# Patient Record
Sex: FEMALE | Race: BLACK OR AFRICAN AMERICAN | NOT HISPANIC OR LATINO | URBAN - METROPOLITAN AREA
[De-identification: names, ages, dates, MRNs, and addresses within clinical notes are randomized per-mention and may not be internally consistent; named-entity substitution may affect disease eponyms.]

---

## 2019-04-19 ENCOUNTER — OFFICE (OUTPATIENT)
Dept: URBAN - METROPOLITAN AREA CLINIC 11 | Facility: CLINIC | Age: 54
End: 2019-04-19

## 2019-04-19 VITALS — WEIGHT: 199 LBS | SYSTOLIC BLOOD PRESSURE: 158 MMHG | HEIGHT: 64 IN | DIASTOLIC BLOOD PRESSURE: 96 MMHG

## 2019-04-19 DIAGNOSIS — R13.10 DYSPHAGIA, UNSPECIFIED: ICD-10-CM

## 2019-04-19 DIAGNOSIS — K59.00 CONSTIPATION, UNSPECIFIED: ICD-10-CM

## 2019-04-19 DIAGNOSIS — R10.12 LEFT UPPER QUADRANT PAIN: ICD-10-CM

## 2019-04-19 DIAGNOSIS — E11.9 TYPE 2 DIABETES MELLITUS WITHOUT COMPLICATIONS: ICD-10-CM

## 2019-04-19 PROCEDURE — 99213 OFFICE O/P EST LOW 20 MIN: CPT | Performed by: INTERNAL MEDICINE

## 2019-04-19 NOTE — SERVICEHPINOTES
54-year-old female with 2 month history of left upper quadrant bloating, occasional nausea and 2 episodes of emesis over the past 2 months.  Patient also reports dysphagia to solids that occurs on a daily basis and requires water to help food pass into her stomach.  Patient underwent upper endoscopy with esophageal biopsies in 2016 which were normal.  It should be noted the patient is diabetic and has been on medications for about 10 years.Patient also reports constipation with stools that are hard and difficult to pass.  She has been placed on stool softeners in the past but is only taking these on a p.r.n. basis.  There has been no sign of fever, chills, weight loss or bleeding.

## 2019-04-29 ENCOUNTER — OFFICE (OUTPATIENT)
Dept: URBAN - METROPOLITAN AREA PATHOLOGY 22 | Facility: PATHOLOGY | Age: 54
End: 2019-04-29

## 2019-04-29 ENCOUNTER — AMBULATORY SURGICAL CENTER (OUTPATIENT)
Dept: URBAN - METROPOLITAN AREA SURGERY 3 | Facility: SURGERY | Age: 54
End: 2019-04-29

## 2019-04-29 ENCOUNTER — OFFICE (OUTPATIENT)
Dept: URBAN - METROPOLITAN AREA CLINIC 11 | Facility: CLINIC | Age: 54
End: 2019-04-29

## 2019-04-29 VITALS
HEART RATE: 72 BPM | RESPIRATION RATE: 13 BRPM | RESPIRATION RATE: 17 BRPM | HEART RATE: 60 BPM | HEART RATE: 70 BPM | SYSTOLIC BLOOD PRESSURE: 145 MMHG | DIASTOLIC BLOOD PRESSURE: 79 MMHG | RESPIRATION RATE: 19 BRPM | DIASTOLIC BLOOD PRESSURE: 86 MMHG | RESPIRATION RATE: 17 BRPM | WEIGHT: 198 LBS | SYSTOLIC BLOOD PRESSURE: 147 MMHG | DIASTOLIC BLOOD PRESSURE: 79 MMHG | OXYGEN SATURATION: 98 % | RESPIRATION RATE: 19 BRPM | HEART RATE: 70 BPM | DIASTOLIC BLOOD PRESSURE: 86 MMHG | WEIGHT: 198 LBS | SYSTOLIC BLOOD PRESSURE: 152 MMHG | SYSTOLIC BLOOD PRESSURE: 150 MMHG | HEART RATE: 60 BPM | OXYGEN SATURATION: 98 % | HEART RATE: 80 BPM | SYSTOLIC BLOOD PRESSURE: 147 MMHG | HEART RATE: 72 BPM | HEIGHT: 64 IN | HEART RATE: 71 BPM | HEIGHT: 64 IN | RESPIRATION RATE: 18 BRPM | DIASTOLIC BLOOD PRESSURE: 84 MMHG | TEMPERATURE: 97.5 F | OXYGEN SATURATION: 96 % | RESPIRATION RATE: 13 BRPM | RESPIRATION RATE: 16 BRPM | SYSTOLIC BLOOD PRESSURE: 145 MMHG | SYSTOLIC BLOOD PRESSURE: 150 MMHG | HEART RATE: 80 BPM | RESPIRATION RATE: 16 BRPM | SYSTOLIC BLOOD PRESSURE: 152 MMHG | RESPIRATION RATE: 18 BRPM | HEART RATE: 71 BPM | DIASTOLIC BLOOD PRESSURE: 92 MMHG | TEMPERATURE: 97.5 F | OXYGEN SATURATION: 96 % | DIASTOLIC BLOOD PRESSURE: 92 MMHG | DIASTOLIC BLOOD PRESSURE: 84 MMHG

## 2019-04-29 DIAGNOSIS — R10.12 LEFT UPPER QUADRANT PAIN: ICD-10-CM

## 2019-04-29 DIAGNOSIS — R13.10 DYSPHAGIA, UNSPECIFIED: ICD-10-CM

## 2019-04-29 PROBLEM — K31.89 OTHER DISEASES OF STOMACH AND DUODENUM: Status: ACTIVE | Noted: 2019-04-29

## 2019-04-29 LAB
GGT: 38 IU/L (ref 0–60)
GGT: 38 IU/L (ref 0–60)
HEPATIC FUNCTION PANEL (7): ALBUMIN: 4 G/DL (ref 3.5–5.5)
HEPATIC FUNCTION PANEL (7): ALBUMIN: 4 G/DL (ref 3.5–5.5)
HEPATIC FUNCTION PANEL (7): ALKALINE PHOSPHATASE: 134 IU/L — HIGH (ref 39–117)
HEPATIC FUNCTION PANEL (7): ALKALINE PHOSPHATASE: 134 IU/L — HIGH (ref 39–117)
HEPATIC FUNCTION PANEL (7): ALT (SGPT): 20 IU/L (ref 0–32)
HEPATIC FUNCTION PANEL (7): ALT (SGPT): 20 IU/L (ref 0–32)
HEPATIC FUNCTION PANEL (7): AST (SGOT): 15 IU/L (ref 0–40)
HEPATIC FUNCTION PANEL (7): AST (SGOT): 15 IU/L (ref 0–40)
HEPATIC FUNCTION PANEL (7): BILIRUBIN, DIRECT: 0.15 MG/DL (ref 0–0.4)
HEPATIC FUNCTION PANEL (7): BILIRUBIN, DIRECT: 0.15 MG/DL (ref 0–0.4)
HEPATIC FUNCTION PANEL (7): BILIRUBIN, TOTAL: 0.7 MG/DL (ref 0–1.2)
HEPATIC FUNCTION PANEL (7): BILIRUBIN, TOTAL: 0.7 MG/DL (ref 0–1.2)
HEPATIC FUNCTION PANEL (7): PROTEIN, TOTAL: 6.7 G/DL (ref 6–8.5)
HEPATIC FUNCTION PANEL (7): PROTEIN, TOTAL: 6.7 G/DL (ref 6–8.5)

## 2019-04-29 PROCEDURE — 88305 TISSUE EXAM BY PATHOLOGIST: CPT | Performed by: INTERNAL MEDICINE

## 2019-04-29 PROCEDURE — 43239 EGD BIOPSY SINGLE/MULTIPLE: CPT | Mod: 59 | Performed by: INTERNAL MEDICINE

## 2019-04-29 PROCEDURE — 43248 EGD GUIDE WIRE INSERTION: CPT | Performed by: INTERNAL MEDICINE

## 2020-12-03 ENCOUNTER — OFFICE (OUTPATIENT)
Dept: URBAN - METROPOLITAN AREA CLINIC 11 | Facility: CLINIC | Age: 55
End: 2020-12-03

## 2020-12-03 VITALS
WEIGHT: 189 LBS | HEIGHT: 64 IN | SYSTOLIC BLOOD PRESSURE: 129 MMHG | OXYGEN SATURATION: 99 % | DIASTOLIC BLOOD PRESSURE: 76 MMHG | HEART RATE: 67 BPM

## 2020-12-03 DIAGNOSIS — E11.9 TYPE 2 DIABETES MELLITUS WITHOUT COMPLICATIONS: ICD-10-CM

## 2020-12-03 DIAGNOSIS — R11.2 NAUSEA WITH VOMITING, UNSPECIFIED: ICD-10-CM

## 2020-12-03 DIAGNOSIS — K57.30 DIVERTICULOSIS OF LARGE INTESTINE WITHOUT PERFORATION OR ABS: ICD-10-CM

## 2020-12-03 DIAGNOSIS — K59.00 CONSTIPATION, UNSPECIFIED: ICD-10-CM

## 2020-12-03 DIAGNOSIS — R68.81 EARLY SATIETY: ICD-10-CM

## 2020-12-03 PROCEDURE — 99214 OFFICE O/P EST MOD 30 MIN: CPT | Performed by: INTERNAL MEDICINE

## 2020-12-03 RX ORDER — METOCLOPRAMIDE 10 MG/1
TABLET ORAL
Qty: 60 | Refills: 1 | Status: COMPLETED
Start: 2020-12-03 | End: 2020-12-10

## 2020-12-03 NOTE — SERVICENOTES
I suspect that this represents a progression of her slight abnormality on gastric emptying study in 2016. Her symptoms are most consistent with gastroparesis.  She expresses concern of possible diverticulitis or even colon cancer.  I have tried to reassure her that neither of these diagnoses seem likely but that we will certainly consider further investigation should treatment for gastroparesis be unsuccessful or other symptoms/signs occur.

## 2020-12-03 NOTE — SERVICEHPINOTES
55-year-old female with longstanding diabetes mellitus comes the office with a 2 month history of postprandial nausea vomiting as well as early satiety.  Emesis contains undigested foods.  Because of her vomiting her weight has dropped by 7 or 8 lb.  There has been no hematemesis melena nor hematochezia.Patient underwent gastric emptying study in July of 2016.  There was an initial delay in emptying but overall the emptying was normal.  Colonoscopy in 2010 in 2013 failed to reveal any neoplastic tissue.  Mild sigmoid diverticulosis was noted.  Upper GI endoscopy in April 2019 failed to reveal any endoscopic abnormality.  Empiric esophageal dilation was performed and gastric biopsies returned normal. Patient's mother had colon polyps in her 60s.

## 2020-12-04 ENCOUNTER — OFFICE (OUTPATIENT)
Dept: URBAN - METROPOLITAN AREA CLINIC 11 | Facility: CLINIC | Age: 55
End: 2020-12-04

## 2020-12-07 ENCOUNTER — OFFICE (OUTPATIENT)
Dept: URBAN - METROPOLITAN AREA CLINIC 11 | Facility: CLINIC | Age: 55
End: 2020-12-07

## 2020-12-07 DIAGNOSIS — R11.2 NAUSEA WITH VOMITING, UNSPECIFIED: ICD-10-CM

## 2020-12-07 PROCEDURE — 82274 ASSAY TEST FOR BLOOD FECAL: CPT | Mod: QW | Performed by: INTERNAL MEDICINE
